# Patient Record
Sex: MALE | Race: WHITE | NOT HISPANIC OR LATINO | URBAN - METROPOLITAN AREA
[De-identification: names, ages, dates, MRNs, and addresses within clinical notes are randomized per-mention and may not be internally consistent; named-entity substitution may affect disease eponyms.]

---

## 2024-10-16 ENCOUNTER — OFFICE VISIT (OUTPATIENT)
Dept: URGENT CARE | Age: 44
End: 2024-10-16
Payer: COMMERCIAL

## 2024-10-16 VITALS
WEIGHT: 230 LBS | BODY MASS INDEX: 30.48 KG/M2 | HEART RATE: 87 BPM | DIASTOLIC BLOOD PRESSURE: 65 MMHG | HEIGHT: 73 IN | OXYGEN SATURATION: 95 % | SYSTOLIC BLOOD PRESSURE: 99 MMHG | TEMPERATURE: 98.8 F | RESPIRATION RATE: 16 BRPM

## 2024-10-16 DIAGNOSIS — J06.9 VIRAL URI: Primary | ICD-10-CM

## 2024-10-16 LAB — POC SARS-COV-2 AG BINAX: NORMAL

## 2024-10-16 PROCEDURE — 87811 SARS-COV-2 COVID19 W/OPTIC: CPT | Performed by: FAMILY MEDICINE

## 2024-10-16 PROCEDURE — 99203 OFFICE O/P NEW LOW 30 MIN: CPT | Performed by: FAMILY MEDICINE

## 2024-10-16 PROCEDURE — 3008F BODY MASS INDEX DOCD: CPT | Performed by: FAMILY MEDICINE

## 2024-10-16 RX ORDER — BENZONATATE 200 MG/1
200 CAPSULE ORAL 3 TIMES DAILY PRN
Qty: 42 CAPSULE | Refills: 0 | Status: SHIPPED | OUTPATIENT
Start: 2024-10-16 | End: 2024-11-15

## 2024-10-16 NOTE — PROGRESS NOTES
"Subjective   Patient ID: Christian Pham is a 43 y.o. male. They present today with a chief complaint of Cough (X6 days ).    History of Present Illness  HPI  Days of cough, congestion, postnasal drip runny nose. No fevers have been noted. Patient denies shortness of breath, chest pain, or wheezing. No red eyes, eye pain, or discharge. They deny nausea vomiting or diarrhea. No known exposures to strep mono influenza, COVID-19 or pneumonia. No skin rashes are noted. Over-the-counter medications are offering minimal relief from symptoms.    6   Past Medical History  Allergies as of 10/16/2024 - Reviewed 10/16/2024   Allergen Reaction Noted    Aspirin Hives 08/23/2023    Cephalexin Swelling 04/19/2017    Penicillins Swelling 04/19/2017       (Not in a hospital admission)       No past medical history on file.    No past surgical history on file.     reports that he has never smoked. He has never used smokeless tobacco.    Review of Systems  Review of Systems as in history of present                               Objective    Vitals:    10/16/24 0850   BP: 99/65   Pulse: 87   Resp: 16   Temp: 37.1 °C (98.8 °F)   SpO2: 95%   Weight: 104 kg (230 lb)   Height: 1.854 m (6' 1\")     No LMP for male patient.    Physical Exam  Gen.-alert cooperative and in no apparent distress  Head and face- no swelling redness, tenderness or rash  Eyes-EOMI, no redness or discharge is noted  ENT- bilateral nasal congestion with clear rhinorrhea and postnasal drip in oral pharynx  Neck- normal range of motion with no lymphadenopathy or mass.   Pulmonary- no respiratory distress noted. Lungs clear to auscultation without wheezes rhonchi or rales  Skin- no rashes discoloration or skin lesions noted  Lymphatic-- no lymph node swelling or tenderness appreciated  Procedures    Point of Care Test & Imaging Results from this visit  Results for orders placed or performed in visit on 10/16/24   POCT Covid-19 Rapid Antigen   Result Value Ref Range    " POC NELSON-COV-2 AG  Presumptive negative test for SARS-CoV-2 (no antigen detected)     Presumptive negative test for SARS-CoV-2 (no antigen detected)      No results found.    Diagnostic study results (if any) were reviewed by Tay Durant MD.    Assessment/Plan   Allergies, medications, history, and pertinent labs/EKGs/Imaging reviewed by Tay Durant MD.     Medical Decision Making  At time of discharge patient was clinically well-appearing and HDS for outpatient management. The patient and/or family was educated regarding diagnosis, supportive care, OTC and Rx medications. The patient and/or family was given the opportunity to ask questions prior to discharge.  They verbalized understanding of my discussion of the plans for treatment, expected course, indications to return to  or seek further evaluation in ED, and the need for timely follow up as directed.   They were provided with a work/school excuse if requested.    Orders and Diagnoses  Diagnoses and all orders for this visit:  Viral URI  -     benzonatate (Tessalon) 200 mg capsule; Take 1 capsule (200 mg) by mouth 3 times a day as needed for cough. Do not crush or chew.  -     POCT Covid-19 Rapid Antigen      Medical Admin Record      Patient disposition: Home    Electronically signed by Tay Durant MD  9:06 AM

## 2024-10-16 NOTE — PATIENT INSTRUCTIONS
HOME CARE INSTRUCTIONS:   ---Over-the-counter cough and cold medications as needed  --- gargle with lightly salted water several times a day  --- May take over-the-counter Tylenol for pain and fever control  --- Plenty of rest and sleep  --- Drink lots of fluids  --- Cover  your mouth and nose when coughing  or sneezing  --- Wash your hands often    SEEK FURTHER MEDICAL ATTENTION OR GO THE EMERGENCY ROOM IF:  --- Fever persists more than 3 days, or elevated over 104°  --- You have difficulty breathing or cannot catch your breath  --- Vomiting: unable to keep liquids, food or medications on stomach  --- Symptoms do not improve after 5-7  --- You Become listless, or get a stiff neck    Advised the patient to seek immediate Emergency Medical attention if symptoms fail to improve, worsen or any concerning symptoms arise.

## 2024-10-16 NOTE — LETTER
October 16, 2024     Patient: Christian Pham   YOB: 1980   Date of Visit: 10/16/2024       To Whom It May Concern:    Christian Pahm was seen in my clinic on 10/16/2024 at 8:30 am. Please excuse Christian for his absence from work on this day to make the appointment.  May return to work 10/17/2024    If you have any questions or concerns, please don't hesitate to call.         Sincerely,         Tay Durant MD        CC: No Recipients

## 2024-10-19 ENCOUNTER — OFFICE VISIT (OUTPATIENT)
Dept: URGENT CARE | Age: 44
End: 2024-10-19
Payer: COMMERCIAL

## 2024-10-19 VITALS
SYSTOLIC BLOOD PRESSURE: 112 MMHG | RESPIRATION RATE: 24 BRPM | BODY MASS INDEX: 30.34 KG/M2 | DIASTOLIC BLOOD PRESSURE: 62 MMHG | TEMPERATURE: 99.2 F | HEART RATE: 71 BPM | OXYGEN SATURATION: 92 % | WEIGHT: 230 LBS

## 2024-10-19 DIAGNOSIS — J01.10 ACUTE FRONTAL SINUSITIS, RECURRENCE NOT SPECIFIED: Primary | ICD-10-CM

## 2024-10-19 DIAGNOSIS — R05.1 ACUTE COUGH: ICD-10-CM

## 2024-10-19 LAB
POC RAPID INFLUENZA B: NEGATIVE
POC SARS-COV-2 AG BINAX: NORMAL

## 2024-10-19 RX ORDER — DOXYCYCLINE 100 MG/1
100 CAPSULE ORAL 2 TIMES DAILY
Qty: 14 CAPSULE | Refills: 0 | Status: SHIPPED | OUTPATIENT
Start: 2024-10-19 | End: 2024-10-26

## 2024-10-19 NOTE — PROGRESS NOTES
Subjective   Patient ID: Christian Pham is a 43 y.o. male. They present today with a chief complaint of Cough and Fatigue.    History of Present Illness  Christian is a pleasant 43-year-old male who presents to the urgent care for evaluation of cough, fatigue and sinus congestion primarily in the forehead area.  Patient states he was seen on Wednesday of last week and given benzonatate Perles for symptom relief.  Patient states no viral testing was done.  Patient denies chest pain or difficulty breathing.  Patient is seeking evaluation reassurance.  No other symptoms or concerns otherwise reported.    Past Medical History  Allergies as of 10/19/2024 - Reviewed 10/19/2024   Allergen Reaction Noted    Aspirin Hives 08/23/2023    Cephalexin Swelling 04/19/2017    Penicillins Swelling 04/19/2017       (Not in a hospital admission)       No past medical history on file.    No past surgical history on file.     reports that he has never smoked. He has never used smokeless tobacco.    Review of Systems  A 10-point review of systems was performed, otherwise unremarkable unless stated in the history of present illness.                Objective    Vitals:    10/19/24 1128   BP: 112/62   Pulse: 71   Resp: 24   Temp: 37.3 °C (99.2 °F)   SpO2: 92%   Weight: 104 kg (230 lb)     No LMP for male patient.    Gen: Vitals noted and reviewed, no evidence of acute distress, well developed and afebrile.   Psych: Mood and affect appropriate for setting.  Head/Face: Atraumatic and normocephalic.   Neuro: No focal deficits noted.  ENT: TMs clear bilaterally, EACs unremarkable. +TTP over b/l frontal sinuses, maxillary and mastoids non-tender. Posterior oropharynx with erythema, without exudate, or swelling. Uvula is in the midline and non-edematous.   Neck: Supple. No meningismus through full range of motion. No lymphadenopathy.   Cardiac: Regular rate and rhythm no murmur.   Lungs: Clear to auscultation throughout, No evidence of wheezing,  rhonchi or crackles. Good aeration throughout.   Extremities: Symmetrical, No peripheral edema  Skin: Without evidence of ecchymosis, wounds, or rashes.      Point of Care Test & Imaging Results from this visit  Results for orders placed or performed in visit on 10/19/24   POCT Influenza A/B manually resulted   Result Value Ref Range    POC Rapid Influenza B Negative Negative   POCT Covid-19 Rapid Antigen   Result Value Ref Range    POC NELSON-COV-2 AG  Presumptive negative test for SARS-CoV-2 (no antigen detected)     Presumptive negative test for SARS-CoV-2 (no antigen detected)      No results found.    Diagnostic study results (if any) were reviewed by Belkis Howard DO.    Assessment/Plan   Allergies, medications, history, and pertinent labs/EKGs/Imaging reviewed by Belkis Howard DO.     Medical Decision Making  Discussed with the patient symptoms and clinical presentation findings suggestive of an acute frontal sinusitis likely secondary to virus of unclear etiology.  Given the patient's duration of symptoms and lack of improvement with supportive treatment and return for second evaluation visit we agreed to initiate antimicrobial coverage and prescribe doxycycline.  We also advised the patient to continue with over-the-counter supportive treatment and attempt to use of fluticasone nasal spray and cetirizine for added symptom relief. Follow up with PCP. We advised seeking immediate emergency medical attention if symptoms fail to improve, worsen or any concerning symptoms arise. Patient voiced full understanding and agreement to plan.      Orders and Diagnoses  Diagnoses and all orders for this visit:  Acute cough  -     POCT Influenza A/B manually resulted  -     POCT Covid-19 Rapid Antigen      Medical Admin Record      Patient disposition: Home    Electronically signed by Belkis Howard DO  11:49 AM